# Patient Record
Sex: FEMALE | Race: WHITE | NOT HISPANIC OR LATINO
[De-identification: names, ages, dates, MRNs, and addresses within clinical notes are randomized per-mention and may not be internally consistent; named-entity substitution may affect disease eponyms.]

---

## 2018-12-27 ENCOUNTER — APPOINTMENT (OUTPATIENT)
Dept: ORTHOPEDIC SURGERY | Facility: CLINIC | Age: 68
End: 2018-12-27
Payer: MEDICARE

## 2018-12-27 VITALS — HEART RATE: 128 BPM | SYSTOLIC BLOOD PRESSURE: 184 MMHG | DIASTOLIC BLOOD PRESSURE: 76 MMHG

## 2018-12-27 VITALS — HEIGHT: 61 IN | BODY MASS INDEX: 49.09 KG/M2 | WEIGHT: 260 LBS

## 2018-12-27 DIAGNOSIS — Z78.9 OTHER SPECIFIED HEALTH STATUS: ICD-10-CM

## 2018-12-27 DIAGNOSIS — S86.812A STRAIN OF OTHER MUSCLE(S) AND TENDON(S) AT LOWER LEG LEVEL, LEFT LEG, INITIAL ENCOUNTER: ICD-10-CM

## 2018-12-27 DIAGNOSIS — Z87.39 PERSONAL HISTORY OF OTHER DISEASES OF THE MUSCULOSKELETAL SYSTEM AND CONNECTIVE TISSUE: ICD-10-CM

## 2018-12-27 DIAGNOSIS — S86.811A STRAIN OF OTHER MUSCLE(S) AND TENDON(S) AT LOWER LEG LEVEL, RIGHT LEG, INITIAL ENCOUNTER: ICD-10-CM

## 2018-12-27 DIAGNOSIS — Z82.62 FAMILY HISTORY OF OSTEOPOROSIS: ICD-10-CM

## 2018-12-27 PROCEDURE — 73562 X-RAY EXAM OF KNEE 3: CPT | Mod: TC,RT

## 2018-12-27 PROCEDURE — 73562 X-RAY EXAM OF KNEE 3: CPT | Mod: RT

## 2018-12-27 PROCEDURE — 99203 OFFICE O/P NEW LOW 30 MIN: CPT

## 2018-12-27 RX ORDER — CYCLOBENZAPRINE HYDROCHLORIDE 7.5 MG/1
TABLET, FILM COATED ORAL
Refills: 0 | Status: ACTIVE | COMMUNITY

## 2018-12-27 RX ORDER — CYCLOBENZAPRINE HYDROCHLORIDE 10 MG/1
10 TABLET, FILM COATED ORAL 3 TIMES DAILY
Qty: 21 | Refills: 0 | Status: ACTIVE | COMMUNITY
Start: 2018-12-27 | End: 1900-01-01

## 2019-02-26 ENCOUNTER — APPOINTMENT (OUTPATIENT)
Dept: ORTHOPEDIC SURGERY | Facility: CLINIC | Age: 69
End: 2019-02-26
Payer: MEDICARE

## 2019-02-26 PROCEDURE — 99213 OFFICE O/P EST LOW 20 MIN: CPT

## 2019-02-26 NOTE — DISCUSSION/SUMMARY
[de-identified] : 68-year-old female with right knee pain\par \par Patient has right knee pain with localized swelling to the posterior compartment as well as some sporadic swelling of the right lower extremity. Consistent with early degenerative arthrosis within the right knee, with likely posterior popliteal cyst. This is likely contributing to her calf pain and dependent edema within the right lower extremity.\par \par Recommendation: Conservative care & observation, this includes rest/activity avoidance until less symptomatic with subsequent gradual return to full activity as tolerated. Patient may also use OTC NSAID's or acetaminophen as tolerated, with application of ice to the area 2-3x daily for 20 minutes after periods of activity. \par \par Discussed role of aspiration with popliteal cysts. If symptoms continue, patient may be a candidate for attempted aspiration with ultrasound imaging posterior knee.\par \par Voiced understanding. Followup one month if symptoms persist

## 2019-02-26 NOTE — HISTORY OF PRESENT ILLNESS
[de-identified] : 68 year old female presents today with right knee and calf pain since December 2019. No injury reported. The pain is constant worse when standing after prolonged sitting. She was evaluated by after hours team and referred to PT. PT has been helping. She reports swelling and pain in the right calf, but there is not swelling today. Patient reports pain through the posterior knee joint, with lower extremity swelling/edema. Denies catching, locking, clicking.\par \par The patient's past medical history, past surgical history, medications and allergies were reviewed by me today with the patient and documented accordingly. In addition, the patient's family and social history, which were noncontributory to this visit, were reviewed also.

## 2019-02-26 NOTE — PHYSICAL EXAM
[de-identified] : Oriented to time, place, person\par Mood: Normal\par Affect: Normal\par Appearance: Healthy, well appearing, no acute distress.\par Gait: Antalgic\par Assistive Devices: Cane\par \par Right knee exam\par \par Skin: Clean, dry, intact\par Inspection: No obvious malalignment, no masses, moderate swelling, no effusion, edema lower extremity\par Pulses: 2+ DP/PT pulses\par ROM: 0-90 degrees of flexion. Posterior/medial pain with deep knee flexion/extension.\par Tenderness: Positive MJLT. Mild LJLT. No pain over the patella facets. No pain to the quadriceps tendon. No pain to the patella tendon. Positive posterior knee tenderness.\par Stability: Stable to varus, valgus. Negative lachman testing. Negative anterior drawer, negative posterior drawer.\par Strength: 5/5 Q/H/TA/GS/EHL, without atrophy\par Neuro: In tact to light touch throughout, DTR's normal\par Additional tests: Positive McMurrays test, Negative patellar grind test. \par \par  [de-identified] : Images were reviewed from [facility] dated 12.27.18. \par \par Multiple images right knee showed no evidence of bony injury, or maura dislocation. There is mild medial underlying degenerative arthritic change seen. Overall alignment is maintained. Otherwise unremarkable.

## 2019-08-26 ENCOUNTER — APPOINTMENT (OUTPATIENT)
Dept: ORTHOPEDIC SURGERY | Facility: CLINIC | Age: 69
End: 2019-08-26
Payer: MEDICARE

## 2019-08-26 VITALS
DIASTOLIC BLOOD PRESSURE: 97 MMHG | HEIGHT: 61 IN | SYSTOLIC BLOOD PRESSURE: 169 MMHG | BODY MASS INDEX: 49.09 KG/M2 | WEIGHT: 260 LBS

## 2019-08-26 DIAGNOSIS — M25.561 PAIN IN RIGHT KNEE: ICD-10-CM

## 2019-08-26 PROCEDURE — 99213 OFFICE O/P EST LOW 20 MIN: CPT

## 2019-09-03 PROBLEM — M25.561 KNEE PAIN, RIGHT: Status: ACTIVE | Noted: 2018-12-27

## 2019-09-03 NOTE — PHYSICAL EXAM
[de-identified] : Oriented to time, place, person\par Mood: Normal\par Affect: Normal\par Appearance: obese, well appearing, no acute distress.\par Gait: Antalgic\par Assistive Devices: Cane\par \par Right knee exam\par \par Skin: Clean, dry, intact\par Inspection: No obvious malalignment, no masses, moderate swelling, no effusion, edema lower extremity\par Pulses: 2+ DP/PT pulses\par ROM: 0-90 degrees of flexion. Posterior/medial pain with deep knee flexion/extension.\par Tenderness: Positive MJLT. Mild LJLT. No pain over the patella facets. No pain to the quadriceps tendon. No pain to the patella tendon. positive posterior knee tenderness.\par Stability: Stable to varus, valgus. Negative lachman testing. Negative anterior drawer, negative posterior drawer.\par Strength: 5/5 Q/H/TA/GS/EHL, without atrophy\par Neuro: In tact to light touch throughout, DTR's normal\par Additional tests: Positive McMurrays test, Negative patellar grind test. \par \par

## 2019-09-03 NOTE — HISTORY OF PRESENT ILLNESS
[de-identified] : 69 year old female presents today with right knee and calf pain since February 2019. The pain is intermittent and worse when standing after prolonged sitting. PT had been helping when she was going for 4 months in the interim. Patient reports pain through the posterior knee joint. Denies catching, locking, clicking.

## 2019-09-03 NOTE — ADDENDUM
[FreeTextEntry1] : This note was written by Magalie Mar on 08/26/2019 acting solely as a scribe for Dr. Joey Thibodeaux.\par \par All medical record entries made by the Scribe were at my, Dr. Joey Thibodeaux, direction and personally dictated by me on 08/26/2019. I have personally reviewed the chart and agree that the record accurately reflects my personal performance of the history, physical exam, assessment and plan.

## 2019-09-03 NOTE — DISCUSSION/SUMMARY
[de-identified] : 69-year-old female with right knee arthritis.\par \par I discussed the treatment of degenerative arthritis with the patient at length today, as well as the chronic degenerative process and likely future progression of the disease. Pain may be related to the bony degenerative changes seen on XR imaging, or the associated degeneration to the soft tissues within the knee joint, including cartilage, meniscus, or ligamentous structures.  I described the spectrum of treatment options available including nonoperative modalities to total joint arthroplasty. Noninvasive and nonoperative treatment modalities include weight reduction, activity modification with low impact exercise, PRN use of acetaminophen or anti-inflammatory medication, natural anti-inflammatory supplements, glucosamine/chondroitin, and physical therapy (for strengthening and conditioning). More invasive treatments can include injection therapies; including cortisone, hyaluronic acid (visco-supplementation), or platelet-rich-plasma (PRP) injections. Definitive treatment could also include future total joint arthroplasty if symptoms persist and cause prolonged disability or interference with activities of daily living.\par  \par The risks and benefits of each treatment option was discussed and all questions were answered. At this time recommendations are for conservative and symptomatic care as detailed above with impact loading activity restriction, low impact exercise and avoidance of strenuous activity.\par  \par Other recommendations include OTC NSAIDs or acetaminophen (if tolerated/able), with application of ice to the knee 2-3x daily for 20 minutes or after periods of activity.\par  \par Recommend trial of Visco supplementation injection therapy. Also discussed role of an aspiration with ultrasound imaging posterior knee.\par \par Patient voiced understanding. If her symptoms do not improve after 2 weeks with NSAIDs management, we will order viscosupplementation.

## 2019-10-03 ENCOUNTER — APPOINTMENT (OUTPATIENT)
Dept: ORTHOPEDIC SURGERY | Facility: CLINIC | Age: 69
End: 2019-10-03
Payer: MEDICARE

## 2019-10-03 PROCEDURE — 99214 OFFICE O/P EST MOD 30 MIN: CPT | Mod: 25

## 2019-10-03 PROCEDURE — 20610 DRAIN/INJ JOINT/BURSA W/O US: CPT | Mod: RT

## 2019-10-10 ENCOUNTER — APPOINTMENT (OUTPATIENT)
Dept: ORTHOPEDIC SURGERY | Facility: CLINIC | Age: 69
End: 2019-10-10
Payer: MEDICARE

## 2019-10-10 PROCEDURE — 20610 DRAIN/INJ JOINT/BURSA W/O US: CPT | Mod: RT

## 2019-10-11 NOTE — END OF VISIT
[FreeTextEntry3] : 69 year old female presents with right knee osteoarthritis.\par \par The first of three Euflexxa injections was given today under sterile conditions into the right knee joint without complication. The patient was instructed on modification of activities over the next 48-72 hours. I advised the patient to ice the knee as needed for control of local irritation from the injection. I advised that some patients have immediate benefit from the initial injection therapy, however it usually takes the medication a number of weeks (~8wks) to provide significant relief of osteoarthritic symptoms.\par \par  We will see the patient back for the second injection in a weeks time.

## 2019-10-11 NOTE — ADDENDUM
[FreeTextEntry1] : This note was written by Fern Toth on 10/10/2019 acting solely as a scribe for Dr. Joey Thibodeaux.\par \par All medical record entries made by the Scribe were at my, Dr. Joey Thibodeaux, direction and personally dictated by me on 10/10/2019. I have personally reviewed the chart and agree that the record accurately reflects my personal performance of the history, physical exam, assessment and plan.\par

## 2019-10-11 NOTE — PROCEDURE
[de-identified] : Injection: Right knee joint.\par Indication: Osteoarthritis.\par \par A discussion was had with the patient regarding this procedure and all questions were answered. All risks, benefits and alternatives were discussed. These included but were not limited to bleeding, infection, and allergic reaction. Alcohol was used to clean the skin, and betadine was used to sterilize and prep the area in the supero-lateral aspect of the right knee. Ethyl chloride spray was then used as a topical anesthetic. A 21-gauge needle was used to inject 2 cc of Euflexxa into the knee. A sterile bandage was then applied. The patient tolerated the procedure well and there were no complications.		\par

## 2019-10-14 NOTE — DISCUSSION/SUMMARY
[de-identified] : 69-year-old female with right knee arthritis.\par \par A discussion was had with the patient regarding continued treatment for her degenerative knee pain. Discussion centered on trial of viscous supplementation for degenerative knee pain. Patient agreeable.\par \par The first of three Euflexxa injections was given today under sterile conditions into the right knee joint without complication. The patient was instructed on modification of activities over the next 48-72 hours. I advised the patient to ice the knee as needed for control of local irritation from the injection. I advised that some patients have immediate benefit from the initial injection therapy, however it usually takes the medication a number of weeks (~8wks) to provide significant relief of osteoarthritic symptoms.\par \par  We will see the patient back for the second injection in a weeks time.\par

## 2019-10-14 NOTE — PHYSICAL EXAM
[de-identified] : Oriented to time, place, person\par Mood: Normal\par Affect: Normal\par Appearance: obese, well appearing, no acute distress.\par Gait: Antalgic\par Assistive Devices: Cane\par \par Right knee exam\par \par Skin: Clean, dry, intact\par Inspection: No obvious malalignment, no masses, moderate swelling, no effusion, edema lower extremity\par Pulses: 2+ DP/PT pulses\par ROM: 0-90 degrees of flexion. Posterior/medial pain with deep knee flexion/extension.\par Tenderness: Positive MJLT. Mild LJLT. No pain over the patella facets. No pain to the quadriceps tendon. No pain to the patella tendon. positive posterior knee tenderness.\par Stability: Stable to varus, valgus. Negative lachman testing. Negative anterior drawer, negative posterior drawer.\par Strength: 5/5 Q/H/TA/GS/EHL, without atrophy\par Neuro: In tact to light touch throughout, DTR's normal\par Additional tests: Positive McMurrays test, Negative patellar grind test. \par \par

## 2019-10-14 NOTE — ADDENDUM
[FreeTextEntry1] : This note was written by Fern Toth on 10/03/2019 acting solely as a scribe for Dr. Joey Thibodeaux.\par \par All medical record entries made by the Scribe were at my, Dr. Joey Thibodeaux, direction and personally dictated by me on 10/03/2019. I have personally reviewed the chart and agree that the record accurately reflects my personal performance of the history, physical exam, assessment and plan.\par

## 2019-10-14 NOTE — PROCEDURE
[de-identified] : Injection: Right knee joint.\par Indication: Osteoarthritis.\par \par A discussion was had with the patient regarding this procedure and all questions were answered. All risks, benefits and alternatives were discussed. These included but were not limited to bleeding, infection, and allergic reaction. Alcohol was used to clean the skin, and betadine was used to sterilize and prep the area in the supero-lateral aspect of the right knee. Ethyl chloride spray was then used as a topical anesthetic. A 21-gauge needle was used to inject 2 cc of Euflexxa into the knee. A sterile bandage was then applied. The patient tolerated the procedure well and there were no complications.		\par

## 2019-10-14 NOTE — HISTORY OF PRESENT ILLNESS
[de-identified] : 69 year old female who has been treated for OA presents today with for follow up of right knee pain. She was prescribed Naproxen at last visit which gave her minimal relief. She is here to discuss HA injections. Rates her pain 6/10. Denies catching, locking, clicking.

## 2019-10-17 ENCOUNTER — APPOINTMENT (OUTPATIENT)
Dept: ORTHOPEDIC SURGERY | Facility: CLINIC | Age: 69
End: 2019-10-17
Payer: MEDICARE

## 2019-10-17 PROCEDURE — 20610 DRAIN/INJ JOINT/BURSA W/O US: CPT | Mod: RT

## 2019-10-21 NOTE — PROCEDURE
[de-identified] : Injection: Right knee joint.\par Indication: Osteoarthritis.\par \par A discussion was had with the patient regarding this procedure and all questions were answered. All risks, benefits and alternatives were discussed. These included but were not limited to bleeding, infection, and allergic reaction. Alcohol was used to clean the skin, and betadine was used to sterilize and prep the area in the supero-lateral aspect of the right knee. Ethyl chloride spray was then used as a topical anesthetic. A 21-gauge needle was used to inject 2 cc of Euflexxa into the knee. A sterile bandage was then applied. The patient tolerated the procedure well and there were no complications.		\par

## 2019-10-21 NOTE — END OF VISIT
[FreeTextEntry3] : 69 year old female presents with right knee osteoarthritis.\par \par The second of three Euflexxa injections was given today under sterile conditions into the right knee joint without complication. I again discussed the role of activity modification/icing following the injection to treat any local irritation from the injection.\par \par We'll have the patient followup for the final injection next week.

## 2019-10-21 NOTE — ADDENDUM
[FreeTextEntry1] : This note was written by Fern Toth on 10/17/2019 acting solely as a scribe for Dr. Joey Thibodeaux.\par \par All medical record entries made by the Scribe were at my, Dr. Joey Thibodeaux, direction and personally dictated by me on 10/17/2019. I have personally reviewed the chart and agree that the record accurately reflects my personal performance of the history, physical exam, assessment and plan.\par

## 2020-06-18 ENCOUNTER — APPOINTMENT (OUTPATIENT)
Dept: ORTHOPEDIC SURGERY | Facility: CLINIC | Age: 70
End: 2020-06-18
Payer: MEDICARE

## 2020-06-18 VITALS — TEMPERATURE: 98.5 F

## 2020-06-18 PROCEDURE — 20610 DRAIN/INJ JOINT/BURSA W/O US: CPT | Mod: 50

## 2020-06-18 PROCEDURE — 99214 OFFICE O/P EST MOD 30 MIN: CPT | Mod: 25

## 2020-06-18 PROCEDURE — 73564 X-RAY EXAM KNEE 4 OR MORE: CPT | Mod: LT

## 2020-06-18 NOTE — DISCUSSION/SUMMARY
[de-identified] : 70-year-old female with bilateral knee osteoarthritis\par \par Patient underwent right Visco supplementation with good effect in October 2019.  Presents today for additional injection therapy for the right knee.  Also has been complaining of left knee osteoarthritic change and pain through the medial compartments.  X-rays are consistent with early arthrosis within the medial and patellofemoral joint consistent with the contralateral side.  Patient would like to trial injection therapy on the left knee in addition.\par \par The first of three Euflexxa injections was given today under sterile conditions into the bilateral knee joint without complication. The patient was instructed on modification of activities over the next 48-72 hours. I advised the patient to ice the knee as needed for control of local irritation from the injection. I advised that some patients have immediate benefit from the initial injection therapy, however it usually takes the medication a number of weeks (~8wks) to provide significant relief of osteoarthritic symptoms. \par \par We will see the patient back for the second injection in a weeks time.

## 2020-06-18 NOTE — HISTORY OF PRESENT ILLNESS
[de-identified] : 70 year old female presents for evaluation bilateral knee pain. Presents today with return of pain in the right knee and left knee x 3 months. She received Euflexxa injection in right knee in October 2019 which gave her 5 months of relief. She is here for assessment of left knee and repeat of Euflexxa series on the right. Pain is intermittent brought on with standing after prolonged sitting. Denies buckling, catching, numbness or tingling.

## 2020-06-18 NOTE — PROCEDURE
[de-identified] : Injection: Right knee joint.\par Indication: Osteoarthritis.\par \par A discussion was had with the patient regarding this procedure and all questions were answered. All risks, benefits and alternatives were discussed. These included but were not limited to bleeding, infection, and allergic reaction. Alcohol was used to clean the skin, and betadine was used to sterilize and prep the area in the supero-lateral aspect of the right knee. Ethyl chloride spray was then used as a topical anesthetic. A 21-gauge needle was used to inject 2 cc of Euflexxa into the knee. A sterile bandage was then applied. The patient tolerated the procedure well and there were no complications. \par \par Injection: Left knee joint.\par Indication: Osteoarthritis.\par \par A discussion was had with the patient regarding this procedure and all questions were answered. All risks, benefits and alternatives were discussed. These included but were not limited to bleeding, infection, and allergic reaction. Alcohol was used to clean the skin, and betadine was used to sterilize and prep the area in the supero-lateral aspect of the left knee. Ethyl chloride spray was then used as a topical anesthetic. A 21-gauge needle was used to inject 2 cc of Euflexxa into the knee. A sterile bandage was then applied. The patient tolerated the procedure well and there were no complications.

## 2020-06-18 NOTE — PHYSICAL EXAM
[de-identified] : Oriented to time, place, person\par Mood: Normal\par Affect: Normal\par Appearance: Healthy, well appearing, no acute distress.\par Gait: Antalgic\par Assistive Devices: Cane\par \par Right knee exam\par \par Skin: Clean, dry, intact\par Inspection: No obvious malalignment, no masses, no swelling, no effusion, min edema lower extremity\par Pulses: 2+ DP/PT pulses\par ROM: 0-100 degrees of flexion. Posterior/medial pain with deep knee flexion/extension.\par Tenderness: Positive MJLT. Mild LJLT. No pain over the patella facets. No pain to the quadriceps tendon. No pain to the patella tendon. Positive posterior knee tenderness.\par Stability: Stable to varus, valgus. Negative lachman testing. Negative anterior drawer, negative posterior drawer.\par Strength: 5/5 Q/H/TA/GS/EHL, without atrophy\par Neuro: In tact to light touch throughout, DTR's normal\par Additional tests: Positive McMurrays test, Negative patellar grind test. \par \par Left knee exam\par \par Skin: Clean, dry, intact\par Inspection: No obvious malalignment, no masses, no swelling, no effusion\par Pulses: 2+ DP/PT pulses\par ROM: 0-110 degrees of flexion. Posterior/medial pain with deep knee flexion/extension.\par Tenderness: Positive MJLT. No LJLT. No pain over the patella facets. No pain to the quadriceps tendon. No pain to the patella tendon.\par Stability: Stable to varus, valgus. Negative lachman testing. Negative anterior drawer, negative posterior drawer.\par Strength: 5/5 Q/H/TA/GS/EHL, without atrophy\par Neuro: In tact to light touch throughout, DTR's normal\par Additional tests: Negative McMurrays test, Negative patellar grind test. \par \par  [de-identified] : \par The following radiographs were ordered and read by me during this patients visit. I reviewed each radiograph in detail with the patient and discussed the findings as highlighted below. \par \par 4 views of the left knee were obtained today that show no acute fracture or dislocation. There is mild medial, no lateral and mild patellofemoral degenerative change seen. There is no significant malalignment. No significant other obvious osseous abnormality, otherwise unremarkable.\par \par Multiple images right knee daated 12.27.18 showed no evidence of bony injury, or maura dislocation. There is mild medial underlying degenerative arthritic change seen. Overall alignment is maintained. Otherwise unremarkable.

## 2020-06-25 ENCOUNTER — APPOINTMENT (OUTPATIENT)
Dept: ORTHOPEDIC SURGERY | Facility: CLINIC | Age: 70
End: 2020-06-25
Payer: MEDICARE

## 2020-06-25 VITALS — TEMPERATURE: 97 F

## 2020-06-25 PROCEDURE — 20610 DRAIN/INJ JOINT/BURSA W/O US: CPT | Mod: 50

## 2020-06-25 NOTE — END OF VISIT
[FreeTextEntry3] : 70-year-old female with bilateral knee osteoarthritis\par \par The second of three Euflexxa injections was given today under sterile conditions into the bilateral knee joint without complication. I again discussed the role of activity modification/icing following the injection to treat any local irritation from the injection. \par \par We'll have the patient followup for the final injection next week.

## 2020-06-25 NOTE — PROCEDURE
[de-identified] : Injection: Right knee joint.\par Indication: Osteoarthritis.\par \par A discussion was had with the patient regarding this procedure and all questions were answered. All risks, benefits and alternatives were discussed. These included but were not limited to bleeding, infection, and allergic reaction. Alcohol was used to clean the skin, and betadine was used to sterilize and prep the area in the supero-lateral aspect of the right knee. Ethyl chloride spray was then used as a topical anesthetic. A 21-gauge needle was used to inject 2 cc of Euflexxa into the knee. A sterile bandage was then applied. The patient tolerated the procedure well and there were no complications. \par \par Injection: Left knee joint.\par Indication: Osteoarthritis.\par \par A discussion was had with the patient regarding this procedure and all questions were answered. All risks, benefits and alternatives were discussed. These included but were not limited to bleeding, infection, and allergic reaction. Alcohol was used to clean the skin, and betadine was used to sterilize and prep the area in the supero-lateral aspect of the left knee. Ethyl chloride spray was then used as a topical anesthetic. A 21-gauge needle was used to inject 2 cc of Euflexxa into the knee. A sterile bandage was then applied. The patient tolerated the procedure well and there were no complications.

## 2020-07-02 ENCOUNTER — APPOINTMENT (OUTPATIENT)
Dept: ORTHOPEDIC SURGERY | Facility: CLINIC | Age: 70
End: 2020-07-02
Payer: MEDICARE

## 2020-07-02 VITALS — TEMPERATURE: 98.3 F

## 2020-07-02 PROCEDURE — 20610 DRAIN/INJ JOINT/BURSA W/O US: CPT | Mod: 50

## 2020-07-02 NOTE — END OF VISIT
[FreeTextEntry3] : 70-year-old female with bilateral knee osteoarthritis\par \par The final Euflexxa injection was given today under sterile conditions into the bilateral knee joint without complication. I discussed the effects of this medication and how long it may provide benefit. Patient has obtained moderate immediate improvement. If no significant long-term benefit, the patient may elect for additional treatment strategies as previously discussed. However, if the patient obtains good relief of symptoms, the injection therapy series can be repeated over the next 6-12 months.\par \par We'll have the patient followup on an as-needed basis at this time.

## 2020-07-02 NOTE — PROCEDURE
[de-identified] : Injection: Right knee joint.\par Indication: Osteoarthritis.\par \par A discussion was had with the patient regarding this procedure and all questions were answered. All risks, benefits and alternatives were discussed. These included but were not limited to bleeding, infection, and allergic reaction. Alcohol was used to clean the skin, and betadine was used to sterilize and prep the area in the supero-lateral aspect of the right knee. Ethyl chloride spray was then used as a topical anesthetic. A 21-gauge needle was used to inject 2 cc of Euflexxa into the knee. A sterile bandage was then applied. The patient tolerated the procedure well and there were no complications. \par \par Injection: Left knee joint.\par Indication: Osteoarthritis.\par \par A discussion was had with the patient regarding this procedure and all questions were answered. All risks, benefits and alternatives were discussed. These included but were not limited to bleeding, infection, and allergic reaction. Alcohol was used to clean the skin, and betadine was used to sterilize and prep the area in the supero-lateral aspect of the left knee. Ethyl chloride spray was then used as a topical anesthetic. A 21-gauge needle was used to inject 2 cc of Euflexxa into the knee. A sterile bandage was then applied. The patient tolerated the procedure well and there were no complications.

## 2020-08-03 ENCOUNTER — APPOINTMENT (OUTPATIENT)
Dept: ORTHOPEDIC SURGERY | Facility: CLINIC | Age: 70
End: 2020-08-03
Payer: MEDICARE

## 2020-08-03 VITALS
WEIGHT: 260 LBS | DIASTOLIC BLOOD PRESSURE: 100 MMHG | HEIGHT: 61 IN | TEMPERATURE: 98 F | BODY MASS INDEX: 49.09 KG/M2 | HEART RATE: 85 BPM | SYSTOLIC BLOOD PRESSURE: 160 MMHG

## 2020-08-03 PROCEDURE — 72100 X-RAY EXAM L-S SPINE 2/3 VWS: CPT

## 2020-08-03 PROCEDURE — 99214 OFFICE O/P EST MOD 30 MIN: CPT

## 2020-11-23 ENCOUNTER — APPOINTMENT (OUTPATIENT)
Dept: ORTHOPEDIC SURGERY | Facility: CLINIC | Age: 70
End: 2020-11-23
Payer: MEDICARE

## 2020-11-23 VITALS — TEMPERATURE: 97.6 F

## 2020-11-23 DIAGNOSIS — M43.16 SPONDYLOLISTHESIS, LUMBAR REGION: ICD-10-CM

## 2020-11-23 PROCEDURE — 99214 OFFICE O/P EST MOD 30 MIN: CPT

## 2021-01-21 ENCOUNTER — APPOINTMENT (OUTPATIENT)
Dept: ORTHOPEDIC SURGERY | Facility: CLINIC | Age: 71
End: 2021-01-21
Payer: MEDICARE

## 2021-01-21 VITALS — TEMPERATURE: 97.1 F

## 2021-01-21 PROCEDURE — 20610 DRAIN/INJ JOINT/BURSA W/O US: CPT | Mod: 50

## 2021-01-21 PROCEDURE — 99214 OFFICE O/P EST MOD 30 MIN: CPT | Mod: 25

## 2021-01-22 NOTE — PHYSICAL EXAM
[de-identified] : Oriented to time, place, person\par Mood: Normal\par Affect: Normal\par Appearance: Healthy, well appearing, no acute distress.\par Gait: Antalgic\par Assistive Devices: Cane\par \par Right knee exam\par \par Skin: Clean, dry, intact\par Inspection: No obvious malalignment, no masses, no swelling, no effusion, min edema lower extremity\par Pulses: 2+ DP/PT pulses\par ROM: 0-100 degrees of flexion. Posterior/medial pain with deep knee flexion/extension.\par Tenderness: Positive MJLT. Mild LJLT. No pain over the patella facets. No pain to the quadriceps tendon. No pain to the patella tendon. Positive posterior knee tenderness.\par Stability: Stable to varus, valgus. Negative lachman testing. Negative anterior drawer, negative posterior drawer.\par Strength: 5/5 Q/H/TA/GS/EHL, without atrophy\par Neuro: In tact to light touch throughout, DTR's normal\par Additional tests: Positive McMurrays test, Negative patellar grind test. \par \par Left knee exam\par \par Skin: Clean, dry, intact\par Inspection: No obvious malalignment, no masses, no swelling, no effusion\par Pulses: 2+ DP/PT pulses\par ROM: 0-110 degrees of flexion. Posterior/medial pain with deep knee flexion/extension.\par Tenderness: Positive MJLT. No LJLT. No pain over the patella facets. No pain to the quadriceps tendon. No pain to the patella tendon.\par Stability: Stable to varus, valgus. Negative lachman testing. Negative anterior drawer, negative posterior drawer.\par Strength: 5/5 Q/H/TA/GS/EHL, without atrophy\par Neuro: In tact to light touch throughout, DTR's normal\par Additional tests: Negative McMurrays test, Negative patellar grind test. \par \par

## 2021-01-22 NOTE — DISCUSSION/SUMMARY
[de-identified] : 70-year-old female with bilateral knee osteoarthritis\par \par Patient underwent right Visco supplementation with good effect in October 2019 and July 2020.  Patient is looking to continue conservative management for osteoarthritic change at this time.  We discussed continued use of viscosupplementation in an attempt to continue current symptomatic/supportive care for her underlying disease.\par \par The first of three Euflexxa injections was given today under sterile conditions into the bilateral knee joint without complication. The patient was instructed on modification of activities over the next 48-72 hours. I advised the patient to ice the knee as needed for control of local irritation from the injection. I advised that some patients have immediate benefit from the initial injection therapy, however it usually takes the medication a number of weeks (~8wks) to provide significant relief of osteoarthritic symptoms. \par \par We will see the patient back for the second injection in a weeks time.

## 2021-01-22 NOTE — PROCEDURE
[de-identified] : Injection: Right knee joint.\par Indication: Osteoarthritis.\par \par A discussion was had with the patient regarding this procedure and all questions were answered. All risks, benefits and alternatives were discussed. These included but were not limited to bleeding, infection, and allergic reaction. Alcohol was used to clean the skin, and betadine was used to sterilize and prep the area in the supero-lateral aspect of the right knee. Ethyl chloride spray was then used as a topical anesthetic. A 21-gauge needle was used to inject 2 cc of Euflexxa into the knee. A sterile bandage was then applied. The patient tolerated the procedure well and there were no complications. \par \par Injection: Left knee joint.\par Indication: Osteoarthritis.\par \par A discussion was had with the patient regarding this procedure and all questions were answered. All risks, benefits and alternatives were discussed. These included but were not limited to bleeding, infection, and allergic reaction. Alcohol was used to clean the skin, and betadine was used to sterilize and prep the area in the supero-lateral aspect of the left knee. Ethyl chloride spray was then used as a topical anesthetic. A 21-gauge needle was used to inject 2 cc of Euflexxa into the knee. A sterile bandage was then applied. The patient tolerated the procedure well and there were no complications.

## 2021-01-28 ENCOUNTER — APPOINTMENT (OUTPATIENT)
Dept: ORTHOPEDIC SURGERY | Facility: CLINIC | Age: 71
End: 2021-01-28
Payer: MEDICARE

## 2021-01-28 VITALS — TEMPERATURE: 97.3 F

## 2021-01-28 PROCEDURE — 20610 DRAIN/INJ JOINT/BURSA W/O US: CPT | Mod: 50

## 2021-01-29 NOTE — ADDENDUM
[FreeTextEntry1] : This note was written by Kiera Mathews on 01/28/2021 acting solely as a scribe for Dr. Joey Thibodeaux.\par \par All medical record entries made by the Scribe were at my, Dr. Joey Thibodeaux, direction and personally dictated by me on 01/28/2021. I have personally reviewed the chart and agree that the record accurately reflects my personal performance of the history, physical exam, assessment and plan.

## 2021-01-29 NOTE — END OF VISIT
[FreeTextEntry3] : 71 y/o female with bilateral knee OA. \par \par The second of three Euflexxa injections was given today under sterile conditions into the bilateral knee joints without complication. I again discussed the role of activity modification/icing following the injection to treat any local irritation from the injection.\par \par We'll have the patient followup for the final injection next week.

## 2021-01-29 NOTE — PROCEDURE
[de-identified] : Injection: Right knee joint.\par Indication: Osteoarthritis. \par \par A discussion was had with the patient regarding this procedure and all questions were answered. All risks, benefits and alternatives were discussed. These included but were not limited to bleeding, infection, and allergic reaction. Alcohol was used to clean the skin, and betadine was used to sterilize and prep the area in the supero-lateral aspect of the right knee. Ethyl chloride spray was then used as a topical anesthetic. A 21-gauge needle was used to inject 2 cc of Euflexxa into the knee. A sterile bandage was then applied. The patient tolerated the procedure well and there were no complications. \par \par Injection: Left knee joint.\par Indication: Osteoarthritis. \par \par A discussion was had with the patient regarding this procedure and all questions were answered. All risks, benefits and alternatives were discussed. These included but were not limited to bleeding, infection, and allergic reaction. Alcohol was used to clean the skin, and betadine was used to sterilize and prep the area in the supero-lateral aspect of the left knee. Ethyl chloride spray was then used as a topical anesthetic. A 21-gauge needle was used to inject 2 cc of Euflexxa into the knee. A sterile bandage was then applied. The patient tolerated the procedure well and there were no complications.

## 2021-02-04 ENCOUNTER — APPOINTMENT (OUTPATIENT)
Dept: ORTHOPEDIC SURGERY | Facility: CLINIC | Age: 71
End: 2021-02-04
Payer: MEDICARE

## 2021-02-04 VITALS — BODY MASS INDEX: 49.09 KG/M2 | HEIGHT: 61 IN | WEIGHT: 260 LBS

## 2021-02-04 VITALS — TEMPERATURE: 97.4 F

## 2021-02-04 PROCEDURE — 20610 DRAIN/INJ JOINT/BURSA W/O US: CPT | Mod: 50

## 2021-02-09 NOTE — END OF VISIT
[FreeTextEntry3] : 69 y/o female with bilateral knee OA. \par \par The final Euflexxa injection was given today under sterile conditions into the bilateral knee joint without complication. I discussed the effects of this medication and how long it may provide benefit. Patient has obtained moderate immediate improvement. If no significant long-term benefit, the patient may elect for additional treatment strategies as previously discussed. However, if the patient obtains good relief of symptoms, the injection therapy series can be repeated over the next 6-12 months.\par \par We'll have the patient followup on an as-needed basis at this time.

## 2021-02-09 NOTE — PROCEDURE
[de-identified] : Injection: Right knee joint.\par Indication: Osteoarthritis. \par \par A discussion was had with the patient regarding this procedure and all questions were answered. All risks, benefits and alternatives were discussed. These included but were not limited to bleeding, infection, and allergic reaction. Alcohol was used to clean the skin, and betadine was used to sterilize and prep the area in the supero-lateral aspect of the right knee. Ethyl chloride spray was then used as a topical anesthetic. A 21-gauge needle was used to inject 2 cc of Euflexxa into the knee. A sterile bandage was then applied. The patient tolerated the procedure well and there were no complications. \par \par Injection: Left knee joint.\par Indication: Osteoarthritis. \par \par A discussion was had with the patient regarding this procedure and all questions were answered. All risks, benefits and alternatives were discussed. These included but were not limited to bleeding, infection, and allergic reaction. Alcohol was used to clean the skin, and betadine was used to sterilize and prep the area in the supero-lateral aspect of the left knee. Ethyl chloride spray was then used as a topical anesthetic. A 21-gauge needle was used to inject 2 cc of Euflexxa into the knee. A sterile bandage was then applied. The patient tolerated the procedure well and there were no complications.

## 2021-02-09 NOTE — ADDENDUM
[FreeTextEntry1] : This note was written by Kiera Mathews on 02/04/2021 acting solely as a scribe for Dr. Joey Thibodeaux.\par \par All medical record entries made by the Scribe were at my, Dr. Joey Thibodeaux, direction and personally dictated by me on 02/04/2021. I have personally reviewed the chart and agree that the record accurately reflects my personal performance of the history, physical exam, assessment and plan.

## 2021-06-28 ENCOUNTER — APPOINTMENT (OUTPATIENT)
Dept: ORTHOPEDIC SURGERY | Facility: CLINIC | Age: 71
End: 2021-06-28
Payer: MEDICARE

## 2021-06-28 PROCEDURE — 99213 OFFICE O/P EST LOW 20 MIN: CPT

## 2021-06-30 NOTE — DISCUSSION/SUMMARY
[de-identified] : 70-year-old female with right  knee osteoarthritis\par \par Patient experiencing flare up of right knee OA. She is not due for Euflexxa injections. Advised NSAIDs. She is concerned about gout referred her to PMD for blood work.  \par \par Follow up for injection therapy in August.

## 2021-06-30 NOTE — PHYSICAL EXAM
[de-identified] : Oriented to time, place, person\par Mood: Normal\par Affect: Normal\par Appearance: Healthy, well appearing, no acute distress.\par Gait: Antalgic\par Assistive Devices: Cane\par \par Right knee exam\par \par Skin: Clean, dry, intact\par Inspection: No obvious malalignment, no masses, no swelling, no effusion, min edema lower extremity\par Pulses: 2+ DP/PT pulses\par ROM: 0-100 degrees of flexion. Posterior/medial pain with deep knee flexion/extension.\par Tenderness: Positive MJLT. Mild LJLT. No pain over the patella facets. No pain to the quadriceps tendon. No pain to the patella tendon. Positive posterior knee tenderness.\par Stability: Stable to varus, valgus. Negative lachman testing. Negative anterior drawer, negative posterior drawer.\par Strength: 5/5 Q/H/TA/GS/EHL, without atrophy\par Neuro: In tact to light touch throughout, DTR's normal\par Additional tests: Positive McMurrays test, Negative patellar grind test. \par \par Left knee exam\par \par Skin: Clean, dry, intact\par Inspection: No obvious malalignment, no masses, no swelling, no effusion\par Pulses: 2+ DP/PT pulses\par ROM: 0-110 degrees of flexion. Posterior/medial pain with deep knee flexion/extension.\par Tenderness: Positive MJLT. No LJLT. No pain over the patella facets. No pain to the quadriceps tendon. No pain to the patella tendon.\par Stability: Stable to varus, valgus. Negative lachman testing. Negative anterior drawer, negative posterior drawer.\par Strength: 5/5 Q/H/TA/GS/EHL, without atrophy\par Neuro: In tact to light touch throughout, DTR's normal\par Additional tests: Negative McMurrays test, Negative patellar grind test. \par \par

## 2021-06-30 NOTE — HISTORY OF PRESENT ILLNESS
[de-identified] : 70 year old female presents for evaluation Right  knee pain.  Sates that she has been on a new diet recently which includes a lot of dairy and fish. She was told by coworkers she may have gout and to stop her new diet. Since she has stopped the new diet her knee pain has gotten a lot better. She is here for NSAIDs medications. Rates her pain 4-5/10 and takes Advil for pain. She received Euflexxa in February which has been helpful and will return in August for repeat injection.  Denies buckling, catching, numbness or tingling.

## 2021-08-09 ENCOUNTER — APPOINTMENT (OUTPATIENT)
Dept: ORTHOPEDIC SURGERY | Facility: CLINIC | Age: 71
End: 2021-08-09
Payer: MEDICARE

## 2021-08-09 PROCEDURE — 20610 DRAIN/INJ JOINT/BURSA W/O US: CPT | Mod: 50

## 2021-08-10 NOTE — PROCEDURE
[de-identified] : Injection: Right knee joint.\par Indication: Osteoarthritis. \par \par A discussion was had with the patient regarding this procedure and all questions were answered. All risks, benefits and alternatives were discussed. These included but were not limited to bleeding, infection, and allergic reaction. Alcohol was used to clean the skin, and betadine was used to sterilize and prep the area in the supero-lateral aspect of the right knee. Ethyl chloride spray was then used as a topical anesthetic. A 21-gauge needle was used to inject 2 cc of Euflexxa into the knee. A sterile bandage was then applied. The patient tolerated the procedure well and there were no complications. \par \par Injection: Left knee joint.\par Indication: Osteoarthritis. \par \par A discussion was had with the patient regarding this procedure and all questions were answered. All risks, benefits and alternatives were discussed. These included but were not limited to bleeding, infection, and allergic reaction. Alcohol was used to clean the skin, and betadine was used to sterilize and prep the area in the supero-lateral aspect of the left knee. Ethyl chloride spray was then used as a topical anesthetic. A 21-gauge needle was used to inject 2 cc of Euflexxa into the knee. A sterile bandage was then applied. The patient tolerated the procedure well and there were no complications.

## 2021-08-10 NOTE — END OF VISIT
[FreeTextEntry3] : 70 y/o female with bilateral knee OA. \par \par The first of three Euflexxa injections was given today under sterile conditions into the bilateral knee joint without complication. The patient was instructed on modification of activities over the next 48-72 hours. I advised the patient to ice the knee as needed for control of local irritation from the injection. I advised that some patients have immediate benefit from the initial injection therapy, however it usually takes the medication a number of weeks (~8wks) to provide significant relief of osteoarthritic symptoms. \par \par We will see the patient back for the second injection in a weeks time.

## 2021-08-10 NOTE — ADDENDUM
[FreeTextEntry1] : This note was written by Kiera Mathews on 08/09/2021 acting solely as a scribe for Dr. Joey Thibodeaux.\par \par All medical record entries made by the Scribe were at my, Dr. Joey Thibodeaux, direction and personally dictated by me on 08/09/2021. I have personally reviewed the chart and agree that the record accurately reflects my personal performance of the history, physical exam, assessment and plan.

## 2021-08-16 ENCOUNTER — APPOINTMENT (OUTPATIENT)
Dept: ORTHOPEDIC SURGERY | Facility: CLINIC | Age: 71
End: 2021-08-16
Payer: MEDICARE

## 2021-08-16 VITALS — BODY MASS INDEX: 49.09 KG/M2 | WEIGHT: 260 LBS | HEIGHT: 61 IN

## 2021-08-16 PROCEDURE — 20610 DRAIN/INJ JOINT/BURSA W/O US: CPT | Mod: 50

## 2021-08-23 ENCOUNTER — APPOINTMENT (OUTPATIENT)
Dept: ORTHOPEDIC SURGERY | Facility: CLINIC | Age: 71
End: 2021-08-23
Payer: MEDICARE

## 2021-08-23 PROCEDURE — 20610 DRAIN/INJ JOINT/BURSA W/O US: CPT | Mod: 50

## 2021-08-23 NOTE — ADDENDUM
[FreeTextEntry1] : This note was written by Kiera Mathews on 08/23/2021 acting solely as a scribe for Dr. Joey Thibodeaux.\par \par All medical record entries made by the Scribe were at my, Dr. Joey Thibodeaux, direction and personally dictated by me on 08/23/2021. I have personally reviewed the chart and agree that the record accurately reflects my personal performance of the history, physical exam, assessment and plan.

## 2021-08-23 NOTE — END OF VISIT
[FreeTextEntry3] : 72 y/o female with bilateral knee OA. \par \par The final Euflexxa injection was given today under sterile conditions into the bilateral knee joint without complication. I discussed the effects of this medication and how long it may provide benefit. Patient has obtained moderate immediate improvement. If no significant long-term benefit, the patient may elect for additional treatment strategies as previously discussed. However, if the patient obtains good relief of symptoms, the injection therapy series can be repeated over the next 6-12 months.\par \par We'll have the patient followup on an as-needed basis at this time.

## 2021-08-23 NOTE — PROCEDURE
[de-identified] : Injection: Right knee joint.\par Indication: Osteoarthritis. \par \par A discussion was had with the patient regarding this procedure and all questions were answered. All risks, benefits and alternatives were discussed. These included but were not limited to bleeding, infection, and allergic reaction. Alcohol was used to clean the skin, and betadine was used to sterilize and prep the area in the supero-lateral aspect of the right knee. Ethyl chloride spray was then used as a topical anesthetic. A 21-gauge needle was used to inject 2 cc of Euflexxa into the knee. A sterile bandage was then applied. The patient tolerated the procedure well and there were no complications. \par \par Injection: Left knee joint.\par Indication: Osteoarthritis. \par \par A discussion was had with the patient regarding this procedure and all questions were answered. All risks, benefits and alternatives were discussed. These included but were not limited to bleeding, infection, and allergic reaction. Alcohol was used to clean the skin, and betadine was used to sterilize and prep the area in the supero-lateral aspect of the left knee. Ethyl chloride spray was then used as a topical anesthetic. A 21-gauge needle was used to inject 2 cc of Euflexxa into the knee. A sterile bandage was then applied. The patient tolerated the procedure well and there were no complications.

## 2021-08-26 NOTE — PROCEDURE
[de-identified] : Injection: Right knee joint.\par Indication: Osteoarthritis. \par \par A discussion was had with the patient regarding this procedure and all questions were answered. All risks, benefits and alternatives were discussed. These included but were not limited to bleeding, infection, and allergic reaction. Alcohol was used to clean the skin, and betadine was used to sterilize and prep the area in the supero-lateral aspect of the right knee. Ethyl chloride spray was then used as a topical anesthetic. A 21-gauge needle was used to inject 2 cc of Euflexxa into the knee. A sterile bandage was then applied. The patient tolerated the procedure well and there were no complications. \par \par Injection: Left knee joint.\par Indication: Osteoarthritis. \par \par A discussion was had with the patient regarding this procedure and all questions were answered. All risks, benefits and alternatives were discussed. These included but were not limited to bleeding, infection, and allergic reaction. Alcohol was used to clean the skin, and betadine was used to sterilize and prep the area in the supero-lateral aspect of the left knee. Ethyl chloride spray was then used as a topical anesthetic. A 21-gauge needle was used to inject 2 cc of Euflexxa into the knee. A sterile bandage was then applied. The patient tolerated the procedure well and there were no complications.

## 2021-08-26 NOTE — ADDENDUM
[FreeTextEntry1] : This note was written by Kiera Mathews on 08/16/2021 acting solely as a scribe for Dr. Joey Thibodeaux.\par \par All medical record entries made by the Scribe were at my, Dr. Joey Thibodeaux, direction and personally dictated by me on 08/16/2021. I have personally reviewed the chart and agree that the record accurately reflects my personal performance of the history, physical exam, assessment and plan.

## 2021-08-26 NOTE — END OF VISIT
[FreeTextEntry3] : 70 y/o female with bilateral knee OA. \par \par The second of three Euflexxa injections was given today under sterile conditions into the bilateral knee joint without complication. I again discussed the role of activity modification/icing following the injection to treat any local irritation from the injection.\par \par We'll have the patient followup for the final injection next week.

## 2022-02-17 ENCOUNTER — APPOINTMENT (OUTPATIENT)
Dept: ORTHOPEDIC SURGERY | Facility: CLINIC | Age: 72
End: 2022-02-17
Payer: MEDICARE

## 2022-02-17 ENCOUNTER — TRANSCRIPTION ENCOUNTER (OUTPATIENT)
Age: 72
End: 2022-02-17

## 2022-02-17 DIAGNOSIS — M17.12 UNILATERAL PRIMARY OSTEOARTHRITIS, LEFT KNEE: ICD-10-CM

## 2022-02-17 PROCEDURE — 99214 OFFICE O/P EST MOD 30 MIN: CPT | Mod: 25

## 2022-02-17 PROCEDURE — 20610 DRAIN/INJ JOINT/BURSA W/O US: CPT | Mod: 50

## 2022-02-24 ENCOUNTER — APPOINTMENT (OUTPATIENT)
Dept: ORTHOPEDIC SURGERY | Facility: CLINIC | Age: 72
End: 2022-02-24
Payer: MEDICARE

## 2022-02-24 PROCEDURE — 20610 DRAIN/INJ JOINT/BURSA W/O US: CPT | Mod: 50

## 2022-02-25 NOTE — ADDENDUM
[FreeTextEntry1] : This note was written by Kiera Mathews on 02/24/2022 acting solely as a scribe for Dr. Joey Thibodeaux.\par \par All medical record entries made by the Scribe were at my, Dr. Joey Thibodeaux, direction and personally dictated by me on 02/24/2022. I have personally reviewed the chart and agree that the record accurately reflects my personal performance of the history, physical exam, assessment and plan.

## 2022-02-25 NOTE — PROCEDURE
[de-identified] : Injection: Right knee joint.\par Indication: Osteoarthritis. \par \par A discussion was had with the patient regarding this procedure and all questions were answered. All risks, benefits and alternatives were discussed. These included but were not limited to bleeding, infection, and allergic reaction. Alcohol was used to clean the skin, and betadine was used to sterilize and prep the area in the supero-lateral aspect of the right knee. Ethyl chloride spray was then used as a topical anesthetic. A 21-gauge needle was used to inject 2 cc of Euflexxa into the knee. A sterile bandage was then applied. The patient tolerated the procedure well and there were no complications. \par \par Injection: Left knee joint.\par Indication: Osteoarthritis. \par \par A discussion was had with the patient regarding this procedure and all questions were answered. All risks, benefits and alternatives were discussed. These included but were not limited to bleeding, infection, and allergic reaction. Alcohol was used to clean the skin, and betadine was used to sterilize and prep the area in the supero-lateral aspect of the left knee. Ethyl chloride spray was then used as a topical anesthetic. A 21-gauge needle was used to inject 2 cc of Euflexxa into the knee. A sterile bandage was then applied. The patient tolerated the procedure well and there were no complications.

## 2022-03-03 ENCOUNTER — APPOINTMENT (OUTPATIENT)
Dept: ORTHOPEDIC SURGERY | Facility: CLINIC | Age: 72
End: 2022-03-03
Payer: MEDICARE

## 2022-03-03 PROCEDURE — 20610 DRAIN/INJ JOINT/BURSA W/O US: CPT | Mod: 50

## 2022-03-07 NOTE — PROCEDURE
[de-identified] : Injection: Right knee joint.\par Indication: Osteoarthritis. \par \par A discussion was had with the patient regarding this procedure and all questions were answered. All risks, benefits and alternatives were discussed. These included but were not limited to bleeding, infection, and allergic reaction. Alcohol was used to clean the skin, and betadine was used to sterilize and prep the area in the supero-lateral aspect of the right knee. Ethyl chloride spray was then used as a topical anesthetic. A 21-gauge needle was used to inject 2 cc of Euflexxa into the knee. A sterile bandage was then applied. The patient tolerated the procedure well and there were no complications. \par \par Injection: Left knee joint.\par Indication: Osteoarthritis. \par \par A discussion was had with the patient regarding this procedure and all questions were answered. All risks, benefits and alternatives were discussed. These included but were not limited to bleeding, infection, and allergic reaction. Alcohol was used to clean the skin, and betadine was used to sterilize and prep the area in the supero-lateral aspect of the left knee. Ethyl chloride spray was then used as a topical anesthetic. A 21-gauge needle was used to inject 2 cc of Euflexxa into the knee. A sterile bandage was then applied. The patient tolerated the procedure well and there were no complications.

## 2022-03-07 NOTE — ADDENDUM
[FreeTextEntry1] : This note was written by Kiera Mathews on 03/03/2022 acting solely as a scribe for Dr. Joey Thibodeaux.\par \par All medical record entries made by the Scribe were at my, Dr. Joey Thibodeaux, direction and personally dictated by me on 03/03/2022. I have personally reviewed the chart and agree that the record accurately reflects my personal performance of the history, physical exam, assessment and plan.

## 2022-03-08 PROBLEM — M17.12 PRIMARY OSTEOARTHRITIS OF LEFT KNEE: Status: ACTIVE | Noted: 2020-06-18

## 2022-03-08 NOTE — PROCEDURE
[de-identified] : Injection: Right knee joint.\par Indication: Osteoarthritis. \par \par A discussion was had with the patient regarding this procedure and all questions were answered. All risks, benefits and alternatives were discussed. These included but were not limited to bleeding, infection, and allergic reaction. Alcohol was used to clean the skin, and betadine was used to sterilize and prep the area in the supero-lateral aspect of the right knee. Ethyl chloride spray was then used as a topical anesthetic. A 21-gauge needle was used to inject 2 cc of Euflexxa into the knee. A sterile bandage was then applied. The patient tolerated the procedure well and there were no complications. \par \par Injection: Left knee joint.\par Indication: Osteoarthritis. \par \par A discussion was had with the patient regarding this procedure and all questions were answered. All risks, benefits and alternatives were discussed. These included but were not limited to bleeding, infection, and allergic reaction. Alcohol was used to clean the skin, and betadine was used to sterilize and prep the area in the supero-lateral aspect of the left knee. Ethyl chloride spray was then used as a topical anesthetic. A 21-gauge needle was used to inject 2 cc of Euflexxa into the knee. A sterile bandage was then applied. The patient tolerated the procedure well and there were no complications.

## 2022-03-08 NOTE — ADDENDUM
[FreeTextEntry1] : This note was written by Kiera Mathews on 02/17/2022 acting solely as a scribe for Dr. Joey Thibodeaux.\par \par All medical record entries made by the Scribe were at my, Dr. Joey Thibodeaux, direction and personally dictated by me on 02/17/2022. I have personally reviewed the chart and agree that the record accurately reflects my personal performance of the history, physical exam, assessment and plan.

## 2022-03-08 NOTE — HISTORY OF PRESENT ILLNESS
[de-identified] : 71 year old female presents for evaluation bilateral knee pain. Presents today with return of pain in the right knee and left knee x 1 month. She received Euflexxa injection in bilateral knees in 8/2021 which gave her significant relief until recently.  Pain is intermittent brought on with standing after prolonged sitting. Denies buckling, catching, numbness or tingling.

## 2022-03-08 NOTE — PHYSICAL EXAM
[de-identified] : Oriented to time, place, person\par Mood: Normal\par Affect: Normal\par Appearance: Healthy, well appearing, no acute distress.\par Gait: Antalgic\par Assistive Devices: Cane\par \par Right knee exam\par \par Skin: Clean, dry, intact\par Inspection: No obvious malalignment, no masses, no swelling, no effusion, min edema lower extremity\par Pulses: 2+ DP/PT pulses\par ROM: 0-100 degrees of flexion. Posterior/medial pain with deep knee flexion/extension.\par Tenderness: Positive MJLT. Mild LJLT. No pain over the patella facets. No pain to the quadriceps tendon. No pain to the patella tendon. Positive posterior knee tenderness.\par Stability: Stable to varus, valgus. Negative lachman testing. Negative anterior drawer, negative posterior drawer.\par Strength: 5/5 Q/H/TA/GS/EHL, without atrophy\par Neuro: In tact to light touch throughout, DTR's normal\par Additional tests: Positive McMurrays test, Negative patellar grind test. \par \par Left knee exam\par \par Skin: Clean, dry, intact\par Inspection: No obvious malalignment, no masses, no swelling, no effusion\par Pulses: 2+ DP/PT pulses\par ROM: 0-110 degrees of flexion. Posterior/medial pain with deep knee flexion/extension.\par Tenderness: Positive MJLT. No LJLT. No pain over the patella facets. No pain to the quadriceps tendon. No pain to the patella tendon.\par Stability: Stable to varus, valgus. Negative lachman testing. Negative anterior drawer, negative posterior drawer.\par Strength: 5/5 Q/H/TA/GS/EHL, without atrophy\par Neuro: In tact to light touch throughout, DTR's normal\par Additional tests: Negative McMurrays test, Negative patellar grind test.

## 2022-03-08 NOTE — DISCUSSION/SUMMARY
[de-identified] : 71-year-old female with bilateral knee osteoarthritis\par \par Patient underwent right Visco supplementation with good effect in August 2021.  Patient is looking to continue conservative management for osteoarthritic change at this time.  We discussed continued use of viscosupplementation in an attempt to continue current symptomatic/supportive care for her underlying disease.\par \par The first of three Euflexxa injections was given today under sterile conditions into the bilateral knee joint without complication. The patient was instructed on modification of activities over the next 48-72 hours. I advised the patient to ice the knee as needed for control of local irritation from the injection. I advised that some patients have immediate benefit from the initial injection therapy, however it usually takes the medication a number of weeks (~8wks) to provide significant relief of osteoarthritic symptoms. \par \par We will see the patient back for the second injection in a weeks time.

## 2022-04-26 RX ORDER — NAPROXEN 500 MG/1
500 TABLET ORAL TWICE DAILY
Qty: 60 | Refills: 0 | Status: ACTIVE | COMMUNITY
Start: 2020-08-03 | End: 1900-01-01

## 2022-04-26 RX ORDER — NAPROXEN 500 MG/1
500 TABLET ORAL
Qty: 1 | Refills: 1 | Status: ACTIVE | COMMUNITY
Start: 2020-11-23 | End: 1900-01-01

## 2022-06-29 ENCOUNTER — APPOINTMENT (OUTPATIENT)
Dept: ORTHOPEDIC SURGERY | Facility: CLINIC | Age: 72
End: 2022-06-29

## 2022-06-29 VITALS — BODY MASS INDEX: 45.31 KG/M2 | WEIGHT: 240 LBS | HEIGHT: 61 IN

## 2022-06-29 DIAGNOSIS — M54.16 RADICULOPATHY, LUMBAR REGION: ICD-10-CM

## 2022-06-29 DIAGNOSIS — M51.36 OTHER INTERVERTEBRAL DISC DEGENERATION, LUMBAR REGION: ICD-10-CM

## 2022-06-29 PROCEDURE — 99214 OFFICE O/P EST MOD 30 MIN: CPT

## 2022-06-29 RX ORDER — TIZANIDINE 4 MG/1
4 TABLET ORAL
Qty: 90 | Refills: 0 | Status: ACTIVE | COMMUNITY
Start: 2022-06-29 | End: 1900-01-01

## 2022-06-29 RX ORDER — NAPROXEN 500 MG/1
500 TABLET ORAL
Qty: 90 | Refills: 0 | Status: ACTIVE | COMMUNITY
Start: 2022-06-29 | End: 1900-01-01

## 2022-06-29 NOTE — HISTORY OF PRESENT ILLNESS
[Improving] : improving [de-identified] : 72 year female presents for follow up evaluation of lower back pain.\narendra Was last seen in November 2020.\par Advil,  Aleeve, and naproxen  offers some relief.\par Biofreeze, Voltaren has been helping. \par Pain radiates down to R leg to knee. \par Denies numbness/tingling.\par Standing upright and walking is not "as aggravating" as before. Laying on side alleviates pain.\par Icing back and hot showers alleviates pain. \narendra Has not done PT for her back.\narendra Has went to Chiropractic care in the past in 2021 - had temporary relief. \par Denies history of epidurals. \par No fever chills sweats nausea vomiting no bowel or bladder dysfunction, no recent weight loss or gain no night pain. This history is in addition to the intake form that I personally reviewed.

## 2022-06-29 NOTE — ADDENDUM
[FreeTextEntry1] : This note was authored by Emilia Cohen working as a medical scribe for Dr. Reinaldo Chacon. The note was reviewed, edited, and revised by Dr. Reinaldo Chacon whom is in agreement with the exam findings, imaging findings, and treatment plan. 11/23/2020.

## 2022-06-29 NOTE — DISCUSSION/SUMMARY
[de-identified] : Lumbar degenerative disease.\par Lumbar radiculopathy.\par L5-S1 spondylolisthesis \par Discussed all options. \par Lumbar HEP.\par Continue Naproxen.\par Tizanidine.  \par Continue Chiropractic care.\par If no better, will obtain lumbar MRI.\par All options discussed including rest, medicine, home exercise, acupuncture, Chiropractic care, Physical Therapy, Pain management, and last resort surgery. All questions were answered, all alternatives discussed and the patient is in complete agreement with that plan. Follow-up appointment as instructed. Any issues and the patient will call or come in sooner.

## 2022-06-29 NOTE — PHYSICAL EXAM
[Normal] : Gait: normal [Alba's Sign] : negative Alba's sign [Pronator Drift] : negative pronator drift [SLR] : negative straight leg raise [de-identified] : 5 out of 5 motor strength, sensation is intact and symmetrical, limited lumbar range of motion flexion extension and rotation, no palpatory tenderness full range of motion of hips knees shoulders and elbows (all four extremities), no atrophy, negative straight leg raise, no pathological reflexes, no swelling, normal ambulation, no apparent distress skin is intact, no palpable lymph nodes, no upper or lower extremity instability, alert and oriented x3 and normal mood. Normal finger-to nose test. \par Limited ROM flexion, extension, and rotation in back.

## 2022-10-07 RX ORDER — NAPROXEN 500 MG/1
500 TABLET ORAL
Qty: 60 | Refills: 1 | Status: ACTIVE | COMMUNITY
Start: 2019-08-26 | End: 1900-01-01

## 2023-07-18 ENCOUNTER — APPOINTMENT (OUTPATIENT)
Dept: ORTHOPEDIC SURGERY | Facility: CLINIC | Age: 73
End: 2023-07-18
Payer: MEDICARE

## 2023-07-18 VITALS — BODY MASS INDEX: 47.2 KG/M2 | HEIGHT: 61 IN | WEIGHT: 250 LBS

## 2023-07-18 DIAGNOSIS — M17.11 UNILATERAL PRIMARY OSTEOARTHRITIS, RIGHT KNEE: ICD-10-CM

## 2023-07-18 PROCEDURE — 99213 OFFICE O/P EST LOW 20 MIN: CPT

## 2023-07-18 PROCEDURE — 73564 X-RAY EXAM KNEE 4 OR MORE: CPT | Mod: RT

## 2023-07-18 RX ORDER — NAPROXEN 500 MG/1
500 TABLET ORAL TWICE DAILY
Qty: 60 | Refills: 1 | Status: ACTIVE | COMMUNITY
Start: 2023-07-18 | End: 1900-01-01

## 2023-07-20 PROBLEM — M17.11 PRIMARY OSTEOARTHRITIS OF RIGHT KNEE: Status: ACTIVE | Noted: 2019-10-11

## 2023-07-20 NOTE — PHYSICAL EXAM
[de-identified] : Oriented to time, place, person\par Mood: Normal\par Affect: Normal\par Appearance: Healthy, well appearing, no acute distress.\par Gait: Antalgic\par Assistive Devices: Cane\par \par Right knee exam\par \par Skin: Clean, dry, intact\par Inspection: No obvious malalignment, no masses, no swelling, no effusion, min edema lower extremity\par Pulses: 2+ DP/PT pulses\par ROM: 0-100 degrees of flexion. Posterior/medial pain with deep knee flexion/extension.\par Tenderness: Positive MJLT. Mild LJLT. No pain over the patella facets. No pain to the quadriceps tendon. No pain to the patella tendon. Positive posterior knee tenderness.\par Stability: Stable to varus, valgus. Negative lachman testing. Negative anterior drawer, negative posterior drawer.\par Strength: 5/5 Q/H/TA/GS/EHL, without atrophy\par Neuro: In tact to light touch throughout, DTR's normal\par Additional tests: Positive McMurrays test, Negative patellar grind test. \par \par Left knee exam\par \par Skin: Clean, dry, intact\par Inspection: No obvious malalignment, no masses, no swelling, no effusion\par Pulses: 2+ DP/PT pulses\par ROM: 0-110 degrees of flexion. Posterior/medial pain with deep knee flexion/extension.\par Tenderness: Positive MJLT. No LJLT. No pain over the patella facets. No pain to the quadriceps tendon. No pain to the patella tendon.\par Stability: Stable to varus, valgus. Negative lachman testing. Negative anterior drawer, negative posterior drawer.\par Strength: 5/5 Q/H/TA/GS/EHL, without atrophy\par Neuro: In tact to light touch throughout, DTR's normal\par Additional tests: Negative McMurrays test, Negative patellar grind test.  [de-identified] : The following radiographs were ordered and read by me during this patients visit. I reviewed each radiograph in detail with the patient and discussed the findings as highlighted below. \par \par 4 views of the right knee were obtained today, 07/18/2023, that show no acute fracture or dislocation. There is moderate medial, mild lateral and moderate patellofemoral degenerative changes seen. There is varus significant malalignment.

## 2023-07-20 NOTE — HISTORY OF PRESENT ILLNESS
[de-identified] : 73 year old female presents for evaluation of right knee.  She is here because she suddenly developed swelling below her knee and pain 2 weeks go. Patient picked up a scab on her lower extremity  and wound kept draining for 2 week. Fluid was clear. She received Euflexxa injection in bilateral knees in 2/2022 which gave her significant relief until recently.  Rates her pain today is minimal without significant discomfort.  2/10. Denies buckling, catching, numbness or tingling.

## 2023-07-20 NOTE — DISCUSSION/SUMMARY
[de-identified] : 73-year-old female with bilateral knee osteoarthritis\par \par Patient underwent right Visco supplementation with good effect in March 2022.  Patient is looking to continue conservative management for osteoarthritic change at this time. She reports that she has no pain on today's visit, so we recommended continuation of conservative care and observation until symptoms are to a point where viscosupplementation can be reconsidered.  Patient is agreeable.\par \par Recommendations: NSAIDs as prescribed. Ice prn. \par \par Follow-up as needed.

## 2023-07-20 NOTE — ADDENDUM
[FreeTextEntry1] : This note was written by Kiera Mathews on 07/18/2023 acting solely as a scribe for Dr. Joey Thibodeaux.\par \par All medical record entries made by the Scribe were at my, Dr. Joey Thibodeaux, direction and personally dictated by me on 07/18/2023. I have personally reviewed the chart and agree that the record accurately reflects my personal performance of the history, physical exam, assessment and plan.

## 2025-01-06 ENCOUNTER — APPOINTMENT (OUTPATIENT)
Dept: ORTHOPEDIC SURGERY | Facility: CLINIC | Age: 75
End: 2025-01-06
Payer: MEDICARE

## 2025-01-06 VITALS — BODY MASS INDEX: 46.26 KG/M2 | WEIGHT: 245 LBS | HEIGHT: 61 IN

## 2025-01-06 DIAGNOSIS — M17.11 UNILATERAL PRIMARY OSTEOARTHRITIS, RIGHT KNEE: ICD-10-CM

## 2025-01-06 PROCEDURE — 73564 X-RAY EXAM KNEE 4 OR MORE: CPT | Mod: RT

## 2025-01-06 PROCEDURE — 99213 OFFICE O/P EST LOW 20 MIN: CPT

## 2025-01-06 RX ORDER — NAPROXEN 500 MG/1
500 TABLET ORAL
Qty: 40 | Refills: 0 | Status: ACTIVE | COMMUNITY
Start: 2025-01-06 | End: 1900-01-01

## 2025-07-01 ENCOUNTER — APPOINTMENT (OUTPATIENT)
Dept: ORTHOPEDIC SURGERY | Facility: CLINIC | Age: 75
End: 2025-07-01
Payer: MEDICARE

## 2025-07-01 VITALS — WEIGHT: 255 LBS | HEIGHT: 61 IN | BODY MASS INDEX: 48.15 KG/M2

## 2025-07-01 PROCEDURE — 73564 X-RAY EXAM KNEE 4 OR MORE: CPT | Mod: LT

## 2025-07-01 PROCEDURE — 99213 OFFICE O/P EST LOW 20 MIN: CPT

## 2025-08-13 ENCOUNTER — APPOINTMENT (OUTPATIENT)
Dept: ORTHOPEDIC SURGERY | Facility: CLINIC | Age: 75
End: 2025-08-13